# Patient Record
(demographics unavailable — no encounter records)

---

## 2025-04-15 NOTE — ASSESSMENT
[FreeTextEntry1] : Impression: 1) Migraine headache without aura, experiencing 15 headache days per month associated with unilateral throbbing pain, photo/phonophobia, nausea, and vomiting. Failed Topiramate, Aimovig, Sophie/Rizatriptan, Nurtec, and Ubrelvy. Currently managing with Emgality and Excedrin prn  Plan: 1)

## 2025-04-16 NOTE — PROCEDURE
[FreeTextEntry1] : BOTOX 200 units; 5 units per muscle site. procerus x 1,  x 2, frontalis x 4, temporalis x 8, occipitalis x 6, cervical paraspinal x 4 and trapezius x 6. The areas to be injected were cleaned with alcohol swabs and allowed to dry prior to injection. An anesthetic spray of Ethyl Chloride ([employer]) was used to desensitize the skin prior to injections.  Botox Lot# [id number], Exp. 09/2027 mixed with 4cc saline  per standard procedure indicated above with these exceptions:  None injected into corrugators.   Total Used: 145U Total Wasted: 55U  The patient tolerated the procedure without issues.

## 2025-04-16 NOTE — PROCEDURE
[FreeTextEntry1] : BOTOX 200 units; 5 units per muscle site. procerus x 1,  x 2, frontalis x 4, temporalis x 8, occipitalis x 6, cervical paraspinal x 4 and trapezius x 6. The areas to be injected were cleaned with alcohol swabs and allowed to dry prior to injection. An anesthetic spray of Ethyl Chloride ([employer]) was used to desensitize the skin prior to injections.  Botox Lot# [id number], Exp. 09/2027 mixed with 4cc saline  per standard procedure indicated above with these exceptions:  None injected into corrugators.   Total Used: 145U Total Wasted: 55U  The patient tolerated the procedure without issues.

## 2025-04-16 NOTE — HISTORY OF PRESENT ILLNESS
[FreeTextEntry1] : 4/16/25 HPI: Alexia is a 28 year old female presenting for Botox for migraine. Last injections 9/3/24.

## 2025-07-28 NOTE — PROCEDURE
[FreeTextEntry1] : BOTOX 200 units; 5 units per muscle site. procerus x 1,  x 2, frontalis x 4, temporalis x 8, occipitalis x 6, cervical paraspinal x 4 and trapezius x 6. The areas to be injected were cleaned with alcohol swabs and allowed to dry prior to injection. An anesthetic spray of Ethyl Chloride ([employer]) was used to desensitize the skin prior to injections.  Botox Lot# [id number], Exp. 02/2028 mixed with 4cc saline  per standard procedure indicated above with these exceptions:  None injected into corrugators.   Total Used: 145U Total Wasted: 55U  The patient tolerated the procedure without issues.

## 2025-07-28 NOTE — PROCEDURE
[FreeTextEntry1] : BOTOX 200 units; 5 units per muscle site. procerus x 1,  x 2, frontalis x 4, temporalis x 8, occipitalis x 6, cervical paraspinal x 4 and trapezius x 6. The areas to be injected were cleaned with alcohol swabs and allowed to dry prior to injection. An anesthetic spray of Ethyl Chloride ([employer]) was used to desensitize the skin prior to injections.  Botox Lot# [id number], Exp. 02/2028 mixed with 4cc saline  per standard procedure indicated above with these exceptions:  None injected into corrugators.   Total Used: 145U Total Wasted: 55U  The patient tolerated the procedure without issues.

## 2025-07-28 NOTE — HISTORY OF PRESENT ILLNESS
[FreeTextEntry1] : 7/28/25 HPI: Alexia is a 28 year old female presenting for Botox for migraine. Last injections 4/16/25.